# Patient Record
Sex: MALE | Race: OTHER | HISPANIC OR LATINO | URBAN - METROPOLITAN AREA
[De-identification: names, ages, dates, MRNs, and addresses within clinical notes are randomized per-mention and may not be internally consistent; named-entity substitution may affect disease eponyms.]

---

## 2021-06-16 ENCOUNTER — EMERGENCY (EMERGENCY)
Facility: HOSPITAL | Age: 71
LOS: 1 days | Discharge: HOME | End: 2021-06-16
Attending: EMERGENCY MEDICINE | Admitting: EMERGENCY MEDICINE
Payer: OTHER MISCELLANEOUS

## 2021-06-16 VITALS
RESPIRATION RATE: 17 BRPM | OXYGEN SATURATION: 99 % | SYSTOLIC BLOOD PRESSURE: 114 MMHG | TEMPERATURE: 98 F | DIASTOLIC BLOOD PRESSURE: 61 MMHG | WEIGHT: 169.98 LBS | HEART RATE: 68 BPM

## 2021-06-16 DIAGNOSIS — Z79.84 LONG TERM (CURRENT) USE OF ORAL HYPOGLYCEMIC DRUGS: ICD-10-CM

## 2021-06-16 DIAGNOSIS — Z77.098 CONTACT WITH AND (SUSPECTED) EXPOSURE TO OTHER HAZARDOUS, CHIEFLY NONMEDICINAL, CHEMICALS: ICD-10-CM

## 2021-06-16 DIAGNOSIS — E11.9 TYPE 2 DIABETES MELLITUS WITHOUT COMPLICATIONS: ICD-10-CM

## 2021-06-16 PROCEDURE — 99283 EMERGENCY DEPT VISIT LOW MDM: CPT

## 2021-06-16 PROCEDURE — 99282 EMERGENCY DEPT VISIT SF MDM: CPT

## 2021-06-16 RX ORDER — ACETAMINOPHEN 500 MG
650 TABLET ORAL ONCE
Refills: 0 | Status: COMPLETED | OUTPATIENT
Start: 2021-06-16 | End: 2021-06-16

## 2021-06-16 RX ORDER — METFORMIN HYDROCHLORIDE 850 MG/1
1 TABLET ORAL
Qty: 0 | Refills: 0 | DISCHARGE

## 2021-06-16 RX ADMIN — Medication 650 MILLIGRAM(S): at 10:04

## 2021-06-16 NOTE — ED ADULT NURSE NOTE - OBJECTIVE STATEMENT
Patient alert and oriented x 3 came c/o left eye pain , feels something in the eye . 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 Redness noted .

## 2021-06-16 NOTE — ED ADULT NURSE NOTE - CAS DISCH ACCOMP BY
Hydroxyzine Counseling: Patient advised that the medication is sedating and not to drive a car after taking this medication.  Patient informed of potential adverse effects including but not limited to dry mouth, urinary retention, and blurry vision.  The patient verbalized understanding of the proper use and possible adverse effects of hydroxyzine.  All of the patient's questions and concerns were addressed. Self

## 2021-06-16 NOTE — ED ADULT TRIAGE NOTE - CHIEF COMPLAINT QUOTE
Patient accidentally got floor wax cream onto his left eye this morning. C/o redness and pain to left eye.

## 2021-06-16 NOTE — ED PROVIDER NOTE - OBJECTIVE STATEMENT
71 yo male h/o dm c/o blue magic floor wax getting into his eye when an open container fell on the floor and splashed him.  Pt washed his eye and now notes it's a bit itchy and mildly irritated/painful as well as red.  No vision change.  Pt w glaucoma R eye and v limited vision.

## 2021-06-16 NOTE — ED PROVIDER NOTE - NSFOLLOWUPINSTRUCTIONS_ED_ALL_ED_FT
Chemical exposure to eye    Return for increased pain, vision change, crusting/discharge, any other concerns.  Keep your eye doctor appointment tomorrow and have them check your eye.      Your L eye vision, pH and cornea were normal today.  We irrigated your eye but it may continue to feel irritated - use tylenol or ibuprofen as needed.    Wear safety glasses in the future.   -----  Regrese por aumento del dolor, cambio de visión, formación de costras / secreción, cualquier otra preocupación. Asista a valle noa con el oculista mañana y pídale que le revise el kerri.        Valle visión del kerri cindy, el pH y la córnea corinna normales hoy. Se irrigamos el kerri renetta valle kerri puede seguir sintiéndose irritado; use tylenol o ibuprofeno según sea necesario.      Use anteojos de seguridad en el futuro.  -----    Chemical Burn of the Eyes, Adult      Chemicals, including many common household products, can burn a person's eyes. Burns typically happen when liquid chemical splashes into the eyes. They can also happen if a chemical powder or fume blows into the eyes. Chemicals can injure the eyes long after first making contact.    Chemical eye burns can range from mild to severe. The severity of the burn depends on the chemical, the amount that got on the eye, and how long it was on the eye. Severe eye burns usually involve strong acids or alkalies. Examples include chemicals used in . Serious burns can damage or scar the eyelids and eyeballs. The damage can be permanent and even lead to blindness.      What are the causes?  This condition may be caused by:  •An accident involving a household product, such as a , detergent, or paint thinner.      •A workplace accident, such as an explosion, spill, or fall.      •An airbag going off in a car accident. Airbags can release a chemical powder.      •An attack involving a chemical.        What increases the risk?    You may be at greater risk for this condition if you work with chemicals. People who work with chemicals include plumbers, janitors, farmers,  workers, auto repair workers, and painters.      What are the signs or symptoms?  Symptoms of this condition include:  •Pain, stinging, or burning in the eye. This may range from mild to so severe that you do not want to open your eyes.      •Eye redness and swelling.      •Blurred vision.      •Deep eye pain when you are exposed to light. The pain can develop hours or days after the injury occurred.      •A hole (perforation) in the eyeball. This is rare.      •A change in eyelid shape (deformity).      •Blindness.        How is this diagnosed?  This condition is diagnosed based on:•Your symptoms and medical history. Your health care provider will ask questions to help him or her assess how severe your chemical burn is, including:  •What type of chemical came in contact with your eye.      •How and when the contact happened.      •What emergency treatment was performed, if any.        •A physical exam. Your tears may be tested to check the level of acid or base. After your health care provider washes the chemical off your eye, he or she will examine the surface of your eye and eyelid. An eye specialist (ophthalmologist) should also do an eye exam to determine how severe the injury is and decide what treatment you need. This eye exam ideally should be done within 24 hours to prevent complications and vision loss.        How is this treated?    This condition is treated by flushing the chemical out of your eye, or irrigating your eye, with water or salt-water (saline). Irrigation should be done even up to several hours after the injury occurred. A medicated eye drop may be used to ease eye pain while the chemical is rinsed off your eye.  After the eye has been cleared of the chemical, treatment may include:  •Prescription or over-the-counter medicine to ease pain and inflammation.      •Antibiotic drops or ointment applied directly to the eye to prevent or treat infection.      •Corticosteroid eye drops or ointments to reduce redness and irritation.    Severe burns may also require:  •A procedure to remove damaged tissue in or around the eye (debridement).      •Placement of a bandage soft contact lens or amniotic membrane tissue on the surface of the eye to protect the eye and help it heal.      •Surgery to secure amniotic membrane tissue to the surface of the eye, reconstruct the eye and surrounding tissue, or to replace damaged parts of the eye with eye tissue from a donor.        Follow these instructions at home:      Medicines      •Take over-the-counter and prescription medicines only as told by your health care provider.      •Take or apply your antibiotic medicine, ointment, or drops as told by your health care provider. Do not stop using the antibiotic even if you start to feel better.      •Wash your hands with soap and water before you apply eye medicines to your eyes. If soap and water are not available, use hand .      Driving     •Ask your health care provider if the medicine prescribed to you requires you to avoid driving or using heavy machinery.      • Do not drive or use heavy machinery if your vision is blurry.      General instructions     • Do not take baths, swim, or use a hot tub until your health care provider approves.    •Until the inflammation is gone or as long as directed by your health care provider:  •Do not wear contact lenses. Wear glasses instead.      •Do not wear eye makeup.      •Do not touch or rub your eyes.        •Return to your normal activities as told by your health care provider. Ask your health care provider what activities are safe for you.      •Keep all follow-up visits as told by your health care provider. This is important.        How is this prevented?    •Wear safety glasses or goggles, a face shield, or a full-face respirator when using potentially dangerous chemicals. This equipment should not block your vision. It should be in good condition, fit properly, and feel comfortable.      • Do not try to use or work with unlabeled chemical containers.      •Use household products only according to directions from the .      •Use chemicals in well-ventilated areas with plenty of fresh air.      • Do not mix chemicals unless directions on the label tell you to do that. Do not combine chemicals from leaking or damaged containers.    •Get rid of chemicals safely.  •Do not dump chemicals down drains, storm sewers, or toilets.      •Do not try to burn household chemicals. Navneet containers clearly and set them aside in a well-ventilated area until they can be discarded properly.        •Keep all chemicals out of reach of children and pets.        Contact a health care provider if:    •Your symptoms do not improve or they get worse.      •You have fluid, blood, or pus coming from the eye area.        Get help right away if:    •You are in severe pain.      •You have vision loss.        Summary    •Chemical eye burns can range from mild to severe.      •This condition is treated by flushing the chemical out of your eye (irrigating) with water or saline right away.      •Take over-the-counter and prescription medicines only as told by your health care provider.      •Wear safety glasses or goggles, a face shield, or a full-face respirator when using potentially dangerous chemicals.  --------------------------------    Quemadura química de los ojos, adulto   Los productos químicos, incluidos muchos productos domésticos comunes, pueden quemar los ojos de marcelo persona. Las quemaduras suelen ocurrir cuando un químico líquido entra en contacto con los ojos. También pueden ocurrir si un polvo químico o humo entra en los ojos. Los productos químicos pueden dañar los ojos mucho después del primer contacto.  Las quemaduras químicas en los ojos pueden variar de leves a graves. La gravedad de la quemadura depende de la sustancia química, la cantidad que entró en contacto con el kerri y el tiempo que estuvo en el kerri. Las quemaduras graves en los ojos generalmente involucran ácidos o álcalis sona. Los ejemplos incluyen productos químicos utilizados en limpiadores de desagües. Las quemaduras graves pueden dañar o dejar cicatrices en los párpados y los globos oculares. El daño puede ser permanente e incluso provocar ceguera.   ¿Cuales son las causas? Esta condición puede ser causada por: • Un accidente que involucre un producto doméstico, sarah un limpiador, detergente o diluyente de pintura.   • Un accidente en el lugar de trabajo, sarah marcelo explosión, un derrame o marcelo caída.   • Marcelo bolsa de aire que se dispara en un accidente automovilístico. Las bolsas de aire pueden liberar un polvo químico.   • Un ataque que involucre marcelo sustancia química.    ¿Qué aumenta el riesgo?  Es posible que tenga un mayor riesgo de padecer esta afección si trabaja con productos químicos. Las personas que trabajan con productos químicos incluyen plomeros, conserjes, agricultores, trabajadores de mantenimiento de piscinas, trabajadores de reparación de automóviles y pintores.   ¿Cuáles son los signos o síntomas? Los síntomas de esta afección incluyen: • Dolor, escozor o ardor en el kerri. [place] puede variar de leve a tan severo que no querrá abrir los ojos.   • Enrojecimiento e hinchazón de los ojos.   •Visión borrosa.   • Dolor de ojos profundo cuando se expone a la archana. El dolor puede desarrollarse horas o días después de que ocurrió la lesión.   • Un agujero (perforación) en el globo ocular. [place] es raro.   • Un cambio en la forma del párpado (deformidad).   •Ceguera.    ¿Cómo se diagnostica esto? Esta afección se diagnostica en función de: • Sangita síntomas e historial médico. Valle proveedor de atención médica le hará preguntas para ayudarlo a evaluar la gravedad de valle quemadura química, que incluyen: • Qué tipo de químico entró en contacto con valle kerri.   • Cómo y cuándo ocurrió el contacto.   • Qué tratamiento de emergencia se realizó, si lo hubo.    • Un examen físico. Es posible que se examinen sangita lágrimas para verificar el nivel de ácido o base. Después de que valle proveedor de atención médica le quite la sustancia química del kerri, examinará la superficie del kerri y el párpado. Un especialista en ojos (oftalmólogo) también debe realizar un examen ocular para determinar qué gilmore grave es la lesión y decidir qué tratamiento necesita. Idealmente, bambi examen de la vista debe realizarse dentro de las 24 horas siguientes para evitar complicaciones y pérdida de la visión.    ¿Cómo se trata esto?  Esta afección se trata enjuagando el químico del kerri o irrigándolo con agua o agua salada (solución salina). El riego debe realizarse incluso hasta varias horas después de que ocurrió la lesión. Se puede usar marcelo gota para los ojos medicada para aliviar el dolor ocular mientras se enjuaga el químico del kerri. Marcelo vez que se ha eliminado la sustancia química del kerri, el tratamiento puede incluir: • Medicamentos recetados o de venta paul para aliviar el dolor y la inflamación.   • Gotas o ungüentos antibióticos aplicados directamente en el kerri para prevenir o tratar marcelo infección.   • Gotas o ungüentos de corticosteroides para los ojos para reducir el enrojecimiento y la irritación.  Las quemaduras graves también pueden requerir: • Un procedimiento para remover tejido dañado dentro o alrededor del kerri (desbridamiento).   • Colocación de un vendaje para lentes de contacto blandos o tejido de la membrana amniótica en la superficie del kerri para protegerlo y ayudarlo a sanar.   • Cirugía para asegurar el tejido de la membrana amniótica a la superficie del kerri, reconstruir el kerri y el tejido circundante, o para reemplazar partes dañadas del kerri con tejido ocular de un donante.    Siga estas instrucciones en casa:   Medicamentos   • [place] medicamentos de venta paul y recetados solo según lo indique valle proveedor de atención médica.   • [place] o aplique valle medicamento antibiótico, ungüento o gotas según le indique valle proveedor de atención médica. No deje de usar el antibiótico incluso si comienza a sentirse mejor.   • Lávese las geraldo con agua y jabón antes de aplicarse medicamentos para los ojos. Si no dispone de agua y jabón, utilice un desinfectante para geraldo.   Conduciendo  • Pregúntele a valle proveedor de atención médica si el medicamento que le recetaron requiere que evite conducir o usar maquinaria pesada.   • No conduzca ni utilice maquinaria pesada si valle visión es borrosa.   Instrucciones generales  • No se bañe, nade ni use un jacuzzi hasta que valle proveedor de atención médica lo apruebe.  • Hasta que la inflamación desaparezca o mientras lo indique valle proveedor de atención médica: •No use lentes de contacto. En valle lugar, use anteojos.   • No use maquillaje para los ojos.   • No se toque ni se frote los ojos.    • Regrese a sangita actividades normales sarah le indicó valle proveedor de atención médica. Pregúntele a valle proveedor de atención médica qué actividades son seguras para usted.   • Asista a todas las visitas de seguimiento que le indique valle proveedor de atención médica. [place] es importante.    ¿Cómo se previene esto?  • Use anteojos de seguridad o antiparras, un protector facial o un respirador de genaro completa cuando use productos químicos potencialmente peligrosos. Bambi equipo no debe bloquear valle visión. Debe estar en buenas condiciones, en forma

## 2021-06-16 NOTE — ED PROVIDER NOTE - CLINICAL SUMMARY MEDICAL DECISION MAKING FREE TEXT BOX
Pt c/o floor wax splash to eye w/o sig abnl on exam - nl pH, nl cornea, + injected conjunctivae.  Pt irrigated w NS x 1 L.  Pt has appt w his ophtho tomorrow already scheduled - instructed to fu tomorrow as planned, return for increased pain, vision change, any other concerns.

## 2021-06-16 NOTE — ED PROVIDER NOTE - PATIENT PORTAL LINK FT
You can access the FollowMyHealth Patient Portal offered by [facility] by registering at the following website: [email or web address]. By joining [employer]’s FollowMyHealth portal, you will also be able to view your health information using other applications (apps) compatible with our system.

## 2022-10-27 NOTE — ED ADULT TRIAGE NOTE - NS ED NURSE BANDS TYPE
-- DO NOT REPLY / DO NOT REPLY ALL --  -- Message is from [facility] [employer] ([facility]) --    General Patient Message:    Patient's wife called to find out why patient was transferred to ICU. Writer let her know he hasn't arrived yet, but it's for alcohol disturbance which she said sounded right. When he is admitted to the ICU, she'd like a call to discuss his care with one of Dr. Grace's nurses and to also make them aware of the situation that happened last night. Please return her call.       Alternative phone number: n/a    Can a detailed message be left? Yes    Message Turnaround: [place]:    Refer to site's KB page for routing instructions    Please give this turnaround time to the caller:   \"You can expect to receive a response 1-3 business days after your provider's clinical team reviews the message\"               Name band;